# Patient Record
Sex: FEMALE | Race: WHITE
[De-identification: names, ages, dates, MRNs, and addresses within clinical notes are randomized per-mention and may not be internally consistent; named-entity substitution may affect disease eponyms.]

---

## 2018-11-18 ENCOUNTER — HOSPITAL ENCOUNTER (EMERGENCY)
Dept: HOSPITAL 96 - M.ERS | Age: 68
Discharge: HOME | End: 2018-11-18
Payer: COMMERCIAL

## 2018-11-18 VITALS — BODY MASS INDEX: 29.41 KG/M2 | WEIGHT: 183.01 LBS | HEIGHT: 66 IN

## 2018-11-18 VITALS — SYSTOLIC BLOOD PRESSURE: 186 MMHG | DIASTOLIC BLOOD PRESSURE: 99 MMHG

## 2018-11-18 DIAGNOSIS — N39.0: Primary | ICD-10-CM

## 2018-11-18 DIAGNOSIS — Z88.5: ICD-10-CM

## 2018-11-18 DIAGNOSIS — Z79.899: ICD-10-CM

## 2018-11-18 DIAGNOSIS — Z88.2: ICD-10-CM

## 2018-11-18 DIAGNOSIS — Z88.6: ICD-10-CM

## 2018-11-18 DIAGNOSIS — F13.239: ICD-10-CM

## 2018-11-18 DIAGNOSIS — Z86.73: ICD-10-CM

## 2018-11-18 DIAGNOSIS — N18.2: ICD-10-CM

## 2018-11-18 DIAGNOSIS — Z90.49: ICD-10-CM

## 2018-11-18 LAB
ALBUMIN SERPL-MCNC: 3.5 G/DL (ref 3.4–5)
ALP SERPL-CCNC: 100 U/L (ref 46–116)
ALT SERPL-CCNC: 31 U/L (ref 30–65)
ANION GAP SERPL CALC-SCNC: 10 MMOL/L (ref 7–16)
AST SERPL-CCNC: 25 U/L (ref 15–37)
BILIRUB SERPL-MCNC: 0.3 MG/DL
BILIRUB UR-MCNC: NEGATIVE MG/DL
BUN SERPL-MCNC: 18 MG/DL (ref 7–18)
CALCIUM SERPL-MCNC: 8.9 MG/DL (ref 8.5–10.1)
CHLORIDE SERPL-SCNC: 105 MMOL/L (ref 98–107)
CO2 SERPL-SCNC: 26 MMOL/L (ref 21–32)
COLOR UR: YELLOW
CREAT SERPL-MCNC: 1.1 MG/DL (ref 0.6–1.3)
GLUCOSE SERPL-MCNC: 87 MG/DL (ref 70–99)
HCT VFR BLD CALC: 40.2 % (ref 37–47)
HGB BLD-MCNC: 13.5 GM/DL (ref 12–15)
KETONES UR STRIP-MCNC: NEGATIVE MG/DL
MCH RBC QN AUTO: 31.6 PG (ref 26–34)
MCHC RBC AUTO-ENTMCNC: 33.7 G/DL (ref 28–37)
MCV RBC: 93.8 FL (ref 80–100)
MPV: 9.8 FL. (ref 7.2–11.1)
PLATELET COUNT*: 180 THOU/UL (ref 150–400)
POTASSIUM SERPL-SCNC: 3.8 MMOL/L (ref 3.5–5.1)
PROT SERPL-MCNC: 7.1 G/DL (ref 6.4–8.2)
PROT UR QL STRIP: NEGATIVE
RBC # BLD AUTO: 4.29 MIL/UL (ref 4.2–5)
RBC # UR STRIP: (no result) /UL
RBC #/AREA URNS HPF: (no result) /HPF (ref 0–2)
RDW-CV: 13 % (ref 10.5–14.5)
SODIUM SERPL-SCNC: 141 MMOL/L (ref 136–145)
SP GR UR STRIP: >= 1.03 (ref 1–1.03)
SQUAMOUS: (no result) /LPF (ref 0–3)
URINE CLARITY: (no result)
URINE GLUCOSE-RANDOM: NEGATIVE
URINE LEUKOCYTES-REFLEX: (no result)
URINE NITRITE-REFLEX: NEGATIVE
URINE WBC-REFLEX: (no result) /HPF (ref 0–5)
UROBILINOGEN UR STRIP-ACNC: 0.2 E.U./DL (ref 0.2–1)
WBC # BLD AUTO: 4.9 THOU/UL (ref 4–11)

## 2019-06-28 ENCOUNTER — HOSPITAL ENCOUNTER (INPATIENT)
Dept: HOSPITAL 96 - M.ERS | Age: 69
LOS: 2 days | Discharge: HOME | DRG: 68 | End: 2019-06-30
Attending: FAMILY MEDICINE | Admitting: FAMILY MEDICINE
Payer: COMMERCIAL

## 2019-06-28 VITALS — DIASTOLIC BLOOD PRESSURE: 68 MMHG | SYSTOLIC BLOOD PRESSURE: 113 MMHG

## 2019-06-28 VITALS — BODY MASS INDEX: 32.17 KG/M2 | WEIGHT: 200.18 LBS | HEIGHT: 65.98 IN

## 2019-06-28 DIAGNOSIS — I12.9: ICD-10-CM

## 2019-06-28 DIAGNOSIS — Z79.2: ICD-10-CM

## 2019-06-28 DIAGNOSIS — R51: ICD-10-CM

## 2019-06-28 DIAGNOSIS — Z88.2: ICD-10-CM

## 2019-06-28 DIAGNOSIS — I95.1: ICD-10-CM

## 2019-06-28 DIAGNOSIS — I65.29: Primary | ICD-10-CM

## 2019-06-28 DIAGNOSIS — Z88.6: ICD-10-CM

## 2019-06-28 DIAGNOSIS — Z87.11: ICD-10-CM

## 2019-06-28 DIAGNOSIS — Z90.49: ICD-10-CM

## 2019-06-28 DIAGNOSIS — E78.5: ICD-10-CM

## 2019-06-28 DIAGNOSIS — F41.9: ICD-10-CM

## 2019-06-28 DIAGNOSIS — N18.2: ICD-10-CM

## 2019-06-28 DIAGNOSIS — Z79.899: ICD-10-CM

## 2019-06-28 DIAGNOSIS — Z86.73: ICD-10-CM

## 2019-06-28 LAB
ABSOLUTE BASOPHILS: 0 THOU/UL (ref 0–0.2)
ABSOLUTE EOSINOPHILS: 0.3 THOU/UL (ref 0–0.7)
ABSOLUTE MONOCYTES: 0.5 THOU/UL (ref 0–1.2)
ALBUMIN SERPL-MCNC: 3.2 G/DL (ref 3.4–5)
ALP SERPL-CCNC: 99 U/L (ref 46–116)
ALT SERPL-CCNC: 29 U/L (ref 30–65)
ANION GAP SERPL CALC-SCNC: 9 MMOL/L (ref 7–16)
APTT BLD: 25.9 SECONDS (ref 25–31.3)
AST SERPL-CCNC: 16 U/L (ref 15–37)
BASOPHILS NFR BLD AUTO: 0.6 %
BILIRUB SERPL-MCNC: 0.3 MG/DL
BILIRUB UR-MCNC: NEGATIVE MG/DL
BUN SERPL-MCNC: 22 MG/DL (ref 7–18)
CALCIUM SERPL-MCNC: 8.5 MG/DL (ref 8.5–10.1)
CHLORIDE SERPL-SCNC: 105 MMOL/L (ref 98–107)
CO2 SERPL-SCNC: 28 MMOL/L (ref 21–32)
COLOR UR: YELLOW
CREAT SERPL-MCNC: 1.2 MG/DL (ref 0.6–1.3)
EOSINOPHIL NFR BLD: 5.3 %
GLUCOSE SERPL-MCNC: 104 MG/DL (ref 70–99)
GRANULOCYTES NFR BLD MANUAL: 47.7 %
HCT VFR BLD CALC: 38.1 % (ref 37–47)
HGB BLD-MCNC: 12.9 GM/DL (ref 12–15)
INR PPP: 1.1
KETONES UR STRIP-MCNC: NEGATIVE MG/DL
LYMPHOCYTES # BLD: 2 THOU/UL (ref 0.8–5.3)
LYMPHOCYTES NFR BLD AUTO: 36.4 %
MCH RBC QN AUTO: 31.7 PG (ref 26–34)
MCHC RBC AUTO-ENTMCNC: 33.8 G/DL (ref 28–37)
MCV RBC: 93.9 FL (ref 80–100)
MONOCYTES NFR BLD: 10 %
MPV: 9.4 FL. (ref 7.2–11.1)
NEUTROPHILS # BLD: 2.6 THOU/UL (ref 1.6–8.1)
NT-PRO BRAIN NAT PEPTIDE: 80 PG/ML (ref ?–300)
NUCLEATED RBCS: 0 /100WBC
PLATELET COUNT*: 215 THOU/UL (ref 150–400)
POTASSIUM SERPL-SCNC: 4 MMOL/L (ref 3.5–5.1)
PROT SERPL-MCNC: 6.6 G/DL (ref 6.4–8.2)
PROT UR QL STRIP: NEGATIVE
PROTHROMBIN TIME: 10.8 SECONDS (ref 9.2–11.5)
RBC # BLD AUTO: 4.06 MIL/UL (ref 4.2–5)
RBC # UR STRIP: NEGATIVE /UL
RBC #/AREA URNS HPF: (no result) /HPF (ref 0–2)
RDW-CV: 13.1 % (ref 10.5–14.5)
SODIUM SERPL-SCNC: 142 MMOL/L (ref 136–145)
SP GR UR STRIP: 1.01 (ref 1–1.03)
SQUAMOUS: (no result) /LPF (ref 0–3)
TROPONIN-I LEVEL: <0.06 NG/ML (ref ?–0.06)
URINE CLARITY: CLEAR
URINE GLUCOSE-RANDOM: NEGATIVE
URINE LEUKOCYTES-REFLEX: (no result)
URINE NITRITE-REFLEX: NEGATIVE
URINE WBC-REFLEX: (no result) /HPF (ref 0–5)
UROBILINOGEN UR STRIP-ACNC: 0.2 E.U./DL (ref 0.2–1)
WBC # BLD AUTO: 5.5 THOU/UL (ref 4–11)

## 2019-06-29 VITALS — DIASTOLIC BLOOD PRESSURE: 59 MMHG | SYSTOLIC BLOOD PRESSURE: 106 MMHG

## 2019-06-29 VITALS — DIASTOLIC BLOOD PRESSURE: 52 MMHG | SYSTOLIC BLOOD PRESSURE: 85 MMHG

## 2019-06-29 VITALS — DIASTOLIC BLOOD PRESSURE: 64 MMHG | SYSTOLIC BLOOD PRESSURE: 106 MMHG

## 2019-06-29 VITALS — DIASTOLIC BLOOD PRESSURE: 44 MMHG | SYSTOLIC BLOOD PRESSURE: 98 MMHG

## 2019-06-29 VITALS — DIASTOLIC BLOOD PRESSURE: 61 MMHG | SYSTOLIC BLOOD PRESSURE: 95 MMHG

## 2019-06-29 VITALS — DIASTOLIC BLOOD PRESSURE: 67 MMHG | SYSTOLIC BLOOD PRESSURE: 131 MMHG

## 2019-06-29 VITALS — DIASTOLIC BLOOD PRESSURE: 52 MMHG | SYSTOLIC BLOOD PRESSURE: 97 MMHG

## 2019-06-29 VITALS — DIASTOLIC BLOOD PRESSURE: 44 MMHG | SYSTOLIC BLOOD PRESSURE: 97 MMHG

## 2019-06-29 VITALS — DIASTOLIC BLOOD PRESSURE: 63 MMHG | SYSTOLIC BLOOD PRESSURE: 122 MMHG

## 2019-06-29 VITALS — DIASTOLIC BLOOD PRESSURE: 71 MMHG | SYSTOLIC BLOOD PRESSURE: 121 MMHG

## 2019-06-29 VITALS — SYSTOLIC BLOOD PRESSURE: 87 MMHG | DIASTOLIC BLOOD PRESSURE: 47 MMHG

## 2019-06-29 VITALS — DIASTOLIC BLOOD PRESSURE: 54 MMHG | SYSTOLIC BLOOD PRESSURE: 107 MMHG

## 2019-06-29 VITALS — DIASTOLIC BLOOD PRESSURE: 66 MMHG | SYSTOLIC BLOOD PRESSURE: 110 MMHG

## 2019-06-29 VITALS — SYSTOLIC BLOOD PRESSURE: 110 MMHG | DIASTOLIC BLOOD PRESSURE: 55 MMHG

## 2019-06-29 LAB
ALBUMIN SERPL-MCNC: 3.1 G/DL (ref 3.4–5)
ALP SERPL-CCNC: 95 U/L (ref 46–116)
ALT SERPL-CCNC: 27 U/L (ref 30–65)
ANION GAP SERPL CALC-SCNC: 6 MMOL/L (ref 7–16)
AST SERPL-CCNC: 15 U/L (ref 15–37)
BILIRUB SERPL-MCNC: 0.3 MG/DL
BUN SERPL-MCNC: 20 MG/DL (ref 7–18)
CALCIUM SERPL-MCNC: 8.9 MG/DL (ref 8.5–10.1)
CHLORIDE SERPL-SCNC: 106 MMOL/L (ref 98–107)
CHOLEST SERPL-MCNC: 188 MG/DL (ref ?–200)
CO2 SERPL-SCNC: 30 MMOL/L (ref 21–32)
CREAT SERPL-MCNC: 1.1 MG/DL (ref 0.6–1.3)
GLUCOSE SERPL-MCNC: 104 MG/DL (ref 70–99)
HDLC SERPL-MCNC: 56 MG/DL (ref 40–?)
LDLC SERPL-MCNC: 125 MG/DL (ref ?–100)
POTASSIUM SERPL-SCNC: 4.6 MMOL/L (ref 3.5–5.1)
PROT SERPL-MCNC: 6 G/DL (ref 6.4–8.2)
SERUM ASSESSMENT: CLEAR
SODIUM SERPL-SCNC: 142 MMOL/L (ref 136–145)
TC:HDL: 3.4 RATIO
TRIGL SERPL-MCNC: 36 MG/DL (ref ?–150)
VLDLC SERPL CALC-MCNC: 7 MG/DL (ref ?–40)

## 2019-06-30 VITALS — SYSTOLIC BLOOD PRESSURE: 113 MMHG | DIASTOLIC BLOOD PRESSURE: 61 MMHG

## 2019-06-30 VITALS — SYSTOLIC BLOOD PRESSURE: 117 MMHG | DIASTOLIC BLOOD PRESSURE: 72 MMHG

## 2019-06-30 VITALS — SYSTOLIC BLOOD PRESSURE: 121 MMHG | DIASTOLIC BLOOD PRESSURE: 74 MMHG

## 2019-06-30 LAB
EST. AVERAGE GLUCOSE BLD GHB EST-MCNC: 105 MG/DL
GLYCOHEMOGLOBIN (HGB A1C): 5.3 % (ref 4.8–5.6)

## 2019-07-01 NOTE — CON
[address]  [address]                    CONSULTATION                  
_______________________________________________________________________________
 
Name:       ERASTODEAN S                    Room:           [address] [place]  
.R.#:  [id number]      Account #:      [id number]  
Admission:  06/28/19     Attend Phys:    Deon Cisse, 
Discharge:  06/30/19     Date of Birth:  [birth year]  
         Report #: [id number]
                                                                     [id number]  
_______________________________________________________________________________
THIS REPORT FOR:  //name//                      
 
CC: Helga Cisse
 
DATE OF SERVICE:  06/29/2019
 
 
HISTORY OF PRESENT ILLNESS:  This is a 68-year-old female patient who was seen
by me, to look for any neurological etiology for the patient's episode of
syncope.  The patient gives a history that she got up, she felt dizzy.  She
thought she was going to pass out and then she had syncope.  She does not know
what her blood pressure was at that time, but here, her blood pressure is
running low.  There was no tonic-clonic activity and there was no postictal
period.  She said she had felt dizzy before when she tries to get up.  It is
always in upright position.  She is on multiple medications, which can affect
the autonomic system; those medications include duloxetine as well as pretty
significant dose of Xanax.  She does take her blood pressure on a regular basis;
she said it usually is not low and usually is pretty good.
 
REVIEW OF SYSTEMS:  Indicates that she had some thyroid problem.  She has a
history of anxiety, some carotid atherosclerotic disease, prior fractures,
syncope and she complained of some pain because of some kidney issues. 
Presently, she is not having any new eye, ENT, cardiac, respiratory, GI, ,
musculoskeletal, constitutional, dermatological, hematological, psychiatric,
throat, allergic symptom associated with present symptomatology.
 
PAST MEDICAL HISTORY:  Positive for some dizziness.
 
FAMILY HISTORY:  Negative for early age stroke.
 
SOCIAL HISTORY:  She indicates she does not smoke.
 
PHYSICAL EXAMINATION:  Indicates the patient is alert, responsive, able to
follow simple and complex command.  Her speech, concentration, fund of knowledge
and memory is at her baseline.  Cranial nerve examination 2-12 looks
unremarkable.  Strength, sensation, reflexes and tones are symmetrical.  There
is no meningeal sign.  There is no carotid bruit.  Cardiac examination is
unremarkable.  No respiratory difficulty.  Pulses are somewhat difficult to
feel.  Blood pressure is 122/63, it has been low.  Pulse is 78.  Temperature is
98.4.
 
LABORATORY DATA:  Indicate normal WBC count, normal sodium.  She did have a CT
angiogram of the head and neck, which appeared mostly unremarkable.
 
IMPRESSION AND PLAN:  This patient's episode is most likely related to her blood
 
 
 
[place] [address]                    CONSULTATION                  
_______________________________________________________________________________
 
Name:       DEAN MAURER                    Room:           [address]#:  [id number]      Account #:      [id number]  
Admission:  06/28/19     Attend Phys:    Deon Cisse, 
Discharge:  06/30/19     Date of Birth:  [birth year]  
         Report #: [id number]
                                                                     [id number]  
_______________________________________________________________________________
pressure than any neurological etiology.  Neurologically, she has some carotid
disease and she is on statin and aspirin and she should continue that.  She
needs an EEG as well as MRI to complete the workup.  I will get an EEG done
today, but if she stays in the hospital, we will get the MRI done here; if she
goes home, we will get it done as an outpatient.  Main emphasis should be to
evaluate and manage the systemic cause, which I will defer to Dr. Chappell.  I
do not believe there is any neurological etiology, especially in light of the
normal workup so far and except as described above, I do not believe any further
workup is necessary from neurological purpose, and the management and evaluation
should be for systemic workup including blood pressure and readjusting her
medication, which can affect the autonomic system, as necessary.
 
 
 
 
 
 
 
 
 
 
 
 
 
 
 
 
 
 
 
 
 
 
 
 
 
 
 
 
 
 
 
 
 
<ELECTRONICALLY SIGNED>
                                        By:  Trey Puri MD         
07/01/19     1229
D: 06/29/19 1226_______________________________________
T: 06/29/19 2114Pruma Puri MD            /nt

## 2019-07-01 NOTE — EEG
[address]  [address]                    EEG STUDY REPORT              
_______________________________________________________________________________
 
Name:       DEAN MAURER                    Room:           [address] [place]  
.R.#:  [id number]      Account #:      [id number]  
Admission:  06/28/19     Attend Phys:    Deon Cisse, 
Discharge:  06/30/19     Date of Birth:  [birth year]  
         Report #: [id number]
                                                                     [id number]  
_______________________________________________________________________________
THIS REPORT FOR:  //name//                      
 
CC: Helga Bowensheath Cisse
 
DATE OF SERVICE:  06/29/2019
 
 
This patient is being evaluated for an episode of syncope.
 
The EEG is being done to evaluate the possibility of seizure.  EEG was done by
placing the electrodes by standard 10-20 system of electrode placement.  Both
referential and sequential montages were used for recording.  Background
activity in this patient's EEG is about 11 Hz and 50 microvolts.  The patient
became drowsy and that is associated with bilateral slowing.  Photic stimulation
was unremarkable.  Throughout the record, no active epileptiform activity was
noticed.
 
IMPRESSION:  This patient's EEG is unremarkable.
 
 
 
 
 
 
 
 
 
 
 
 
 
 
 
 
 
 
 
 
 
 
 
 
 
<ELECTRONICALLY SIGNED>
                                        By:  Trey Puri MD         
07/01/19     1229
D: 06/29/19 1714_______________________________________
T: 06/29/19 1751Pruma Puri MD            /nt

## 2019-07-01 NOTE — EKG
[place] [address]
Phone:  [phone]                     ELECTROCARDIOGRAM REPORT      
_______________________________________________________________________________
 
Name:       DEAN MAURER                    Room:           [address] [place]  
[facility]#:  [id number]      Account #:      [id number]  
Admission:  19     Attend Phys:    Deon Cisse, 
Discharge:  19     Date of Birth:  [birth year]  
         Report #: [id number]
    [id number]
_______________________________________________________________________________
THIS REPORT FOR:  //name//                      
 
                         [facility] ED
                                       
Test Date:    2019               Test Time:    21:14:13
Pat Name:     DEAN MAURER                Department:   
Patient ID:   [id number]            Room:         [facility]
Gender:       F                        Technician:   DOLORES
:          1950               Requested By: Nikko Holm
Order Number: [id number] MD:   Vicente Romero
                                 Measurements
Intervals                              Axis          
Rate:         73                       P:            45
NJ:           155                      QRS:          25
QRSD:         149                      T:            68
QT:           442                                    
QTc:          488                                    
                           Interpretive Statements
Sinus rhythm
Left bundle branch block
Baseline wander in lead(s) V2,V6
Compared to ECG 10/06/2017 16:41:07
No significant changes
 
Electronically Signed On 2019 14:40:11 CDT by Vicente Romero
[email or web address]
 
 
 
 
 
 
 
 
 
 
 
 
 
 
 
 
 
  <ELECTRONICALLY SIGNED>
                                           By: Vicente Romero MD, FACC   
  19     1440
D: 19   _____________________________________
T: 19   Vicente Romero MD, [facility]     /EPI

## 2020-07-18 ENCOUNTER — HOSPITAL ENCOUNTER (OUTPATIENT)
Dept: HOSPITAL 96 - M.ERS | Age: 70
Setting detail: OBSERVATION
LOS: 2 days | Discharge: HOME | End: 2020-07-20
Admitting: INTERNAL MEDICINE
Payer: COMMERCIAL

## 2020-07-18 DIAGNOSIS — E78.5: ICD-10-CM

## 2020-07-18 DIAGNOSIS — E05.90: ICD-10-CM

## 2020-07-18 DIAGNOSIS — I38: ICD-10-CM

## 2020-07-18 DIAGNOSIS — R07.89: Primary | ICD-10-CM

## 2020-07-18 DIAGNOSIS — I10: ICD-10-CM

## 2020-07-18 DIAGNOSIS — F32.9: ICD-10-CM

## 2020-07-18 DIAGNOSIS — I44.7: ICD-10-CM

## 2020-07-18 DIAGNOSIS — F41.9: ICD-10-CM
